# Patient Record
Sex: MALE
[De-identification: names, ages, dates, MRNs, and addresses within clinical notes are randomized per-mention and may not be internally consistent; named-entity substitution may affect disease eponyms.]

---

## 2019-07-09 PROBLEM — Z00.00 ENCOUNTER FOR PREVENTIVE HEALTH EXAMINATION: Status: ACTIVE | Noted: 2019-07-09

## 2019-07-19 ENCOUNTER — APPOINTMENT (OUTPATIENT)
Dept: ORTHOPEDIC SURGERY | Facility: CLINIC | Age: 59
End: 2019-07-19

## 2022-05-12 ENCOUNTER — EMERGENCY (EMERGENCY)
Facility: HOSPITAL | Age: 62
LOS: 1 days | Discharge: HOME | End: 2022-05-12
Attending: EMERGENCY MEDICINE
Payer: MEDICAID

## 2022-05-12 VITALS
HEART RATE: 99 BPM | TEMPERATURE: 99 F | SYSTOLIC BLOOD PRESSURE: 122 MMHG | HEIGHT: 74 IN | RESPIRATION RATE: 17 BRPM | OXYGEN SATURATION: 96 % | DIASTOLIC BLOOD PRESSURE: 82 MMHG | WEIGHT: 249.12 LBS

## 2022-05-12 VITALS
SYSTOLIC BLOOD PRESSURE: 144 MMHG | HEART RATE: 89 BPM | DIASTOLIC BLOOD PRESSURE: 91 MMHG | RESPIRATION RATE: 18 BRPM | TEMPERATURE: 98 F | OXYGEN SATURATION: 95 %

## 2022-05-12 PROCEDURE — 99285 EMERGENCY DEPT VISIT HI MDM: CPT

## 2022-05-12 PROCEDURE — 70486 CT MAXILLOFACIAL W/O DYE: CPT | Mod: MA

## 2022-05-12 PROCEDURE — 99284 EMERGENCY DEPT VISIT MOD MDM: CPT | Mod: 25

## 2022-05-12 PROCEDURE — 70486 CT MAXILLOFACIAL W/O DYE: CPT | Mod: 26,MA

## 2022-05-12 PROCEDURE — 70450 CT HEAD/BRAIN W/O DYE: CPT | Mod: MA

## 2022-05-12 PROCEDURE — 70450 CT HEAD/BRAIN W/O DYE: CPT | Mod: 26,MA

## 2022-05-12 NOTE — ED PROVIDER NOTE - OBJECTIVE STATEMENT
62 y/o man, no PMH, c/o accidental trip and fall 2 days prior, striking left temporal area on ground and suffering abrasion to that area.  No LOC/headache/nausea/vomiting/weakness/numbness/neck pain.  He does not take any anticoagulation.

## 2022-05-12 NOTE — ED PROVIDER NOTE - PATIENT PORTAL LINK FT
You can access the FollowMyHealth Patient Portal offered by [facility] by registering at the following website: [email or web address]. By joining [facility]’s FollowMyHealth portal, you will also be able to view your health information using other applications (apps) compatible with our system.

## 2022-05-12 NOTE — ED PROVIDER NOTE - NSFOLLOWUPINSTRUCTIONS_ED_ALL_ED_FT
Lesión en la jose a en los adultos    Head Injury, Adult       Hay muchos tipos de lesiones en la jose a. Las lesiones en la jose a pueden ser tan leves jean un chichón pequeño o pueden ser un problema médico grave. Algunas de las lesiones graves en la jose a son:  •Lesión que provoque un impacto en el cerebro (conmoción cerebral).      •Un moretón (contusión) en el cerebro. [place] significa que hay hemorragia en el cerebro que puede causar hinchazón.      •Fisura en el cráneo (fractura de cráneo).      •Hemorragia en el cerebro que se acumula, se coagula y forma shannon protuberancia (hematoma).      Después de shannon lesión en la jose a, la mayoría de los problemas ocurren dentro de las primeras 24 horas, eugenio los efectos secundarios pueden aparecer entre 7 y 10 días después de la lesión. Es importante controlar waters afección para josue si hay cambios. Es posible que deban observarlo en el departamento de emergencias o en el servicio de atención urgente, o también puede ser que deban hospitalizarlo.      ¿Cuáles son las causas?    Hay muchas causas posibles de shannon lesión en la jose a. Las lesiones graves en la jose a puede deberse a un accidente automovilístico, a accidentes en bicicleta o motocicleta, a lesiones deportivas, a caídas y a ser golpeado por un objeto.      ¿Cuáles son los síntomas?    Los síntomas de lesión en la jose a incluyen shannon contusión, un chichón o sangrado en el lugar de la lesión. Otros síntomas físicos pueden ser:  •Dolor de jose a.      •Náuseas o vómitos.      •Mareos.      •Visión borrosa o doble.      •Sentir incomodidad cerca de luces brillantes o ruidos hi.      •Convulsiones.      •Cansancio.      •Dificultad para despertarse.      •Pérdida de la conciencia.      Los síntomas mentales o emocionales pueden incluir los siguientes:  •Irritabilidad.      •Confusión y problemas de memoria.      •Falta de atención y concentración.      •Cambios en los hábitos de alimentación o en el sueño.      •Sentir ansiedad o depresión.        ¿Cómo se diagnostica?    Esta afección suele diagnosticarse en función de los síntomas, de shannon descripción de la lesión y de un examen físico. También pueden hacerle estudios de diagnóstico por imágenes, jean shannon resonancia magnética (RM) o shannon exploración por tomografía computarizada (TC).      ¿Cómo se trata?    El tratamiento de esta afección depende de la gravedad y el tipo de lesión que sufrió. El objetivo principal del tratamiento es prevenir complicaciones y darle tiempo al cerebro para que se recupere.    Lesión de jose a leve     Si usted sufre shannon lesión de jose a leve, es posible que lo envíen a casa, y el tratamiento puede incluir lo siguiente:  •Observación. Un adulto responsable debe quedarse con usted conchita 24 horas después de producida la lesión y controlarlo con frecuencia.      •Reedy físico.      •Reedy cerebral.      •Analgésicos.      Lesión de jose a grave    Si tiene shannon lesión de jose a grave, el tratamiento puede incluir lo siguiente:•Observación minuciosa. [place] incluye hospitalización con la siguiente atención:  •Exámenes físicos frecuentes.      •Controles frecuentes del funcionamiento del cerebro y el sistema nervioso (estado neurológico).      •Control de la presión arterial y los niveles de oxígeno.        •Medicamentos para aliviar el dolor, prevenir las convulsiones y disminuir la hinchazón del cerebro.      •Protección de las vías respiratorias y asistencia respiratoria. [place] puede incluir un respirador.      •Tratamientos para controlar y tratar la hinchazón dentro del cerebro.    •Cirugía de cerebro. Esta puede ser necesaria en los siguientes casos:  •Extraer shannon acumulación de cristina o coágulos de cristina.      •Interrumpir el sangrado.      •Retirar shannon parte del cráneo para dejar espacio a fin de que el cerebro se hinche.          Siga estas instrucciones en waters casa:    Actividad     •Descanse y evite actividades que tay físicamente difíciles o agotadoras.      •Asegúrese de dormir lo suficiente.    •Deje que el cerebro descanse limitando las actividades que requieran pensar mucho o prestar atención, jean las siguientes:  •Mirar televisión.      •Jugar juegos de memoria y armar rompecabezas.      •Tareas para el hogar o trabajos relacionados con el empleo.      •Trabajar en la computadora, usar las redes sociales y enviar mensajes de texto.        •Evite actividades que puedan causar otra lesión en la jose a, jean practicar deportes, hasta que el médico lo autorice. Puede ser peligroso tener otra lesión en la jose a antes de que se haya recuperado de la primera.      •Pregúntele al médico cuándo puede retomar helena actividades habituales, incluidos el trabajo o el estudio. Pídale al médico un plan escalonado para retomar helena actividades de forma gradual.      •Consulte a waters médico sobre cuándo puede conducir, andar en bicicleta o usar maquinaria pesada. La capacidad para reaccionar puede ser más lenta luego de shannon lesión cerebral. No realice estas actividades si se siente mareado.        Estilo de corazon      • No campos alcohol hasta que el médico lo autorice. No consuma drogas. El alcohol y ciertas drogas pueden demorar waters recuperación y ponerlo en riesgo de nuevas lesiones.      •Si le resulta más difícil que lo habitual recordar las cosas, escríbalas.      •Trate de hacer shannon cosa por vez si se distrae con facilidad.      •Consulte con familiares y amigos si debe patric decisiones importantes.      •Cuénteles sobre waters lesión, los síntomas y las restricciones a helena amigos, a waters wilfred, a un colega de confianza y a waters gerente en el trabajo. Pídales que observen si aparecen nuevos problemas o empeoran los existentes.      Instrucciones generales     •[place] los medicamentos de venta carisa y los recetados solamente jean se lo haya indicado el médico.      •Pídale a alguien que lo acompañe conchita 24 horas después de la lesión en la jose a. Esta persona debe observar cambios en los síntomas y estar preparada para obtener ayuda médica.      •Concurra a todas las visitas de seguimiento jean se lo haya indicado el médico. [place] es importante.        ¿Cómo se susana?    •Trabaje para mejorar el equilibrio y la fuerza a fin de evitar caídas.      •Use el cinturón de seguridad cuando se encuentre en un vehículo en movimiento.      •Use un joel al andar en bicicleta, esquiar o practicar cualquier otro deporte o actividad que tenga riesgo de lesiones.    •Si jasmin alcohol:•Limite la cantidad que jasmin:  •De 0 a 1 medida por día para las mujeres que no estén embarazadas.      •De 0 a 2 medidas por día para los hombres.        •Esté atento a la cantidad de alcohol que hay en las bebidas que landon. En los [place], shannon medida equivale a shannon botella de cerveza de 12 oz (355 ml), un vaso de vino de 5 oz (148 ml) o un vaso de shannon bebida alcohólica de odalis graduación de 1½ oz (44 ml).      •[place] medidas de seguridad en waters hogar, por ejemplo:  •Mantenga los pisos y las escaleras en orden.      •Coloque barras para sostén en los kathleen y pasamanos en las escaleras.      •Ponga alfombras antideslizantes en pisos y bañeras.      •Mejore la iluminación en las zonas de penumbra.          Dónde buscar más información    •Centers for Disease Control and Prevention (Centros para el Control y la Prevención de Enfermedades): www.cdc.gov        Solicite ayuda de inmediato si:  •Tiene lo siguiente:  •Dolor de jose a intenso que no desaparece con los medicamentos.      •Dificultad para caminar o debilidad en los brazos o las piernas.      •Secreción transparente o con cristina que proviene de la nariz o de los oídos.      •Cambios en la visión.      •Shannon convulsión.      •Mayor confusión o irritabilidad.        •[place] síntomas empeoran.      •Está más somnoliento de lo normal o tiene dificultad para mantenerse despierto.      •Pierde el equilibrio.      •Las pupilas cambian de tamaño.      •Arrastra las palabras.      •Waters mareo empeora.      •Vomita.      Estos síntomas pueden representar un problema grave que constituye shannon emergencia. No espere a josue si los síntomas desaparecen. Solicite atención médica de inmediato. Comuníquese con el servicio de emergencias de waters localidad (911 en los [place]). No conduzca por helena propios medios hasta el hospital.       Resumen    •Las lesiones en la jose a pueden ser leves o pueden ser un problema médico grave que requiere atención inmediata.      •El tratamiento de esta afección depende de la gravedad y el tipo de lesión que sufrió.      •Pídale a alguien que lo acompañe conchita 24 horas después de la lesión y que maribeth cómo está usted con frecuencia.      •Pregúntele al médico cuándo puede retomar helena actividades habituales, incluidos el trabajo o el estudio.      •La prevención de lesiones en la jose a incluye el uso de cinturón de seguridad en un vehículo motorizado, el uso de joel en bicicleta, limitar el consumo de alcohol y patric medidas de seguridad en el hogar.      Esta información no tiene jean fin reemplazar el consejo del médico. Asegúrese de hacerle al médico cualquier pregunta que tenga.      Document Revised: 01/22/2021 Document Reviewed: 01/22/2021    [employer] Patient Education © 2022 [employer]

## 2022-05-12 NOTE — ED PROVIDER NOTE - CLINICAL SUMMARY MEDICAL DECISION MAKING FREE TEXT BOX
60 y/o man, no PMH, c/o accidental trip and fall 2 days prior, striking left temporal area on ground and suffering abrasion to that area--CT head/maxillofacial area unremarkable, no evidence of trauma.  Advised strict return precautions and PMD f/u.

## 2022-05-12 NOTE — ED PROVIDER NOTE - ENMT, MLM
+abrasion and mild tenderness/swelling to left temporal area, no bony stepoff; Airway patent, Nasal mucosa clear. Mouth with normal mucosa. Throat has no vesicles, no oropharyngeal exudates and uvula is midline.

## 2022-05-12 NOTE — ED ADULT NURSE NOTE - OBJECTIVE STATEMENT
Patient present for evaluation S/P fall 2 days ago reports +LOC, sustained swelling and bruise to left eye and chick noted, denies any pain, dizziness unsteadiness, nausea/vomiting.